# Patient Record
Sex: MALE | Race: BLACK OR AFRICAN AMERICAN | NOT HISPANIC OR LATINO | Employment: OTHER | URBAN - METROPOLITAN AREA
[De-identification: names, ages, dates, MRNs, and addresses within clinical notes are randomized per-mention and may not be internally consistent; named-entity substitution may affect disease eponyms.]

---

## 2019-05-13 PROBLEM — E11.8 TYPE 2 DIABETES MELLITUS WITH COMPLICATION, WITHOUT LONG-TERM CURRENT USE OF INSULIN: Status: ACTIVE | Noted: 2019-05-13

## 2019-05-13 PROBLEM — H90.3 SENSORINEURAL HEARING LOSS (SNHL) OF BOTH EARS: Status: ACTIVE | Noted: 2018-03-26

## 2019-05-13 PROBLEM — L72.0 EPIDERMAL INCLUSION CYST: Status: ACTIVE | Noted: 2019-05-13

## 2019-05-13 PROBLEM — L72.0 EPIDERMOID CYST OF SKIN: Status: ACTIVE | Noted: 2019-03-25

## 2019-06-21 PROBLEM — H40.1133 PRIMARY OPEN ANGLE GLAUCOMA (POAG) OF BOTH EYES, SEVERE STAGE: Status: ACTIVE | Noted: 2019-06-21

## 2020-01-10 PROBLEM — H40.1132 PRIMARY OPEN ANGLE GLAUCOMA (POAG) OF BOTH EYES, MODERATE STAGE: Status: ACTIVE | Noted: 2019-06-21

## 2020-02-26 PROBLEM — H01.00B BLEPHARITIS OF UPPER AND LOWER EYELIDS OF BOTH EYES: Status: ACTIVE | Noted: 2020-02-26

## 2020-02-26 PROBLEM — H01.00A BLEPHARITIS OF UPPER AND LOWER EYELIDS OF BOTH EYES: Status: ACTIVE | Noted: 2020-02-26

## 2021-01-29 PROBLEM — H02.052 TRICHIASIS OF RIGHT LOWER EYELID: Status: ACTIVE | Noted: 2021-01-29

## 2021-01-29 PROBLEM — E11.9 CONTROLLED TYPE 2 DIABETES MELLITUS WITHOUT COMPLICATION, WITHOUT LONG-TERM CURRENT USE OF INSULIN: Status: ACTIVE | Noted: 2019-05-13

## 2021-08-04 PROBLEM — H40.1133 PRIMARY OPEN ANGLE GLAUCOMA (POAG) OF BOTH EYES, SEVERE STAGE: Status: ACTIVE | Noted: 2021-08-04

## 2022-04-28 PROBLEM — Z09 S/P EYE SURGERY, FOLLOW-UP EXAM: Status: ACTIVE | Noted: 2022-04-28

## 2022-08-01 PROBLEM — Z09 S/P EYE SURGERY, FOLLOW-UP EXAM: Status: RESOLVED | Noted: 2022-04-28 | Resolved: 2022-08-01

## 2023-02-09 PROBLEM — K02.9 CARIES: Status: ACTIVE | Noted: 2023-02-09

## 2023-11-18 PROBLEM — K13.79 LESION OF SOFT PALATE: Status: ACTIVE | Noted: 2023-11-18

## 2023-11-18 PROBLEM — R22.0 MASS OF SOFT PALATE: Status: ACTIVE | Noted: 2023-11-18

## 2023-11-18 PROBLEM — K13.79 LESION OF SOFT PALATE: Status: RESOLVED | Noted: 2023-11-18 | Resolved: 2023-11-18

## 2023-12-08 PROBLEM — R22.1 MASS OF RIGHT SIDE OF NECK: Status: ACTIVE | Noted: 2023-12-08

## 2024-01-25 PROBLEM — Z51.11 ENCOUNTER FOR ANTINEOPLASTIC CHEMOTHERAPY: Status: ACTIVE | Noted: 2024-01-25

## 2024-01-25 PROBLEM — Z01.818 EXAMINATION PRIOR TO CHEMOTHERAPY: Status: ACTIVE | Noted: 2024-01-25

## 2024-02-01 PROBLEM — C05.1 SQUAMOUS CELL CARCINOMA OF SOFT PALATE: Status: ACTIVE | Noted: 2024-02-01

## 2024-02-02 PROBLEM — R11.2 CHEMOTHERAPY INDUCED NAUSEA AND VOMITING: Status: ACTIVE | Noted: 2024-02-02

## 2024-02-02 PROBLEM — T45.1X5A CHEMOTHERAPY INDUCED NAUSEA AND VOMITING: Status: ACTIVE | Noted: 2024-02-02

## 2024-03-19 PROBLEM — G40.909 SEIZURE DISORDER: Status: ACTIVE | Noted: 2024-03-19

## 2024-03-19 PROBLEM — N17.9 AKI (ACUTE KIDNEY INJURY): Status: ACTIVE | Noted: 2024-03-19

## 2024-03-19 PROBLEM — E87.5 HYPERKALEMIA: Status: ACTIVE | Noted: 2024-03-19

## 2024-06-24 PROBLEM — N17.9 AKI (ACUTE KIDNEY INJURY): Status: RESOLVED | Noted: 2024-03-19 | Resolved: 2024-06-24

## 2024-09-20 ENCOUNTER — SOCIAL WORK (OUTPATIENT)
Dept: ADMINISTRATIVE | Facility: OTHER | Age: 59
End: 2024-09-20

## 2024-09-20 NOTE — PROGRESS NOTES
Consult received to assist pt with obtaining a [employer] Flexitouch pump. Called [phone] to discuss consult received for assistance and the process and pt reported that he did not know anything about a pump as this was not discussed with him. Informed pt that Sw will follow up with MD and call him on Monday after speaking with MD. Will continue to follow.       Grady Cleaning LMSW    Ext [phone]/Pager [phone]

## 2024-09-27 ENCOUNTER — SOCIAL WORK (OUTPATIENT)
Dept: ADMINISTRATIVE | Facility: OTHER | Age: 59
End: 2024-09-27

## 2024-09-27 NOTE — PROGRESS NOTES
Informed by NP vi secure chat that she called and spoke with pt regarding the flexitouch pump. Called [phone] and discussed with him regarding referral for assistance with the flexitouch pump through [employer] [facility] and informed him that someone would be calling him to discuss it in more details. Pt verbalized understanding. Faxed referral to [phone] for assistance. Will continue to follow pt and assist as needed.       Grady Cleaning LMSW    Ext [phone]/Pager [phone]

## 2024-10-03 ENCOUNTER — SOCIAL WORK (OUTPATIENT)
Dept: ADMINISTRATIVE | Facility: OTHER | Age: 59
End: 2024-10-03

## 2024-10-03 NOTE — PROGRESS NOTES
Sent a email to Mercedez Posadas with [facility] to follow up with her in regards to receiving referral for flexitouch device. Awaiting a response.       Grady Cleaning LMSW    Ext [phone]/Pager [phone]

## 2024-10-09 ENCOUNTER — SOCIAL WORK (OUTPATIENT)
Dept: ADMINISTRATIVE | Facility: OTHER | Age: 59
End: 2024-10-09

## 2024-10-09 NOTE — PROGRESS NOTES
Received email from Mercedez Posadas with [employer] confirming receipt of referral for pt and states pt should be getting a call this week for the flexitouch eval. Will continue to follow pt and assist.       Grady Cleaning LMSW    Ext [phone]/Pager [phone]

## 2024-10-10 PROBLEM — I89.0 LYMPHEDEMA DUE TO RADIATION: Status: ACTIVE | Noted: 2024-10-10

## 2024-11-06 ENCOUNTER — OUTPATIENT CASE MANAGEMENT (OUTPATIENT)
Dept: ADMINISTRATIVE | Facility: OTHER | Age: 59
End: 2024-11-06

## 2024-11-06 NOTE — PROGRESS NOTES
Sent a follow up email to Mercedez Posadas(Senior Account ) with [facility] on 10/25/24 to get a status update on the Lymphedema Flexitouch device. Received a response email from Mercedez cortés informing that they are still waiting for insurance decision. Will continue to follow.       Grady Cleaning LMSW    Ext [phone]/Pager [phone]

## 2024-12-19 ENCOUNTER — SOCIAL WORK (OUTPATIENT)
Dept: ADMINISTRATIVE | Facility: OTHER | Age: 59
End: 2024-12-19

## 2024-12-19 NOTE — PROGRESS NOTES
Sent follow up email to Mercedez with [facility] to get a status update on the Flexitouch Lymphedema device. Awaiting a response email. Will continue to follow.       Grady Cleaning LMSW  Outpatient Case Management Social Worker  Ext [phone]

## 2025-02-03 ENCOUNTER — OUTPATIENT CASE MANAGEMENT (OUTPATIENT)
Dept: ADMINISTRATIVE | Facility: OTHER | Age: 60
End: 2025-02-03

## 2025-02-03 NOTE — PROGRESS NOTES
Sent a follow up email to Mercedez with [facility] on 1/14/25 to get a status update on the lymphedema device and received a response on 1/21/25 back from Mercedez reporting that pt insurance denied and they have reached out to pt numerous times to appeal for pt but no response. Will continue to follow pt and assist as needed      Grady Cleaning LMSW  Outpatient Case Management Social Worker  Ext [phone]

## 2025-03-19 PROBLEM — E11.9 DIABETES MELLITUS WITHOUT COMPLICATION: Status: ACTIVE | Noted: 2025-03-19

## 2025-03-19 PROBLEM — L84 CORNS AND CALLOSITIES: Status: ACTIVE | Noted: 2025-03-19

## 2025-03-19 PROBLEM — E11.40 DIABETIC NEUROPATHY WITH NEUROLOGIC COMPLICATION: Status: ACTIVE | Noted: 2025-03-19

## 2025-03-19 PROBLEM — L60.3 DYSTROPHIC NAIL: Status: ACTIVE | Noted: 2025-03-19

## 2025-03-19 PROBLEM — E11.49 DIABETIC NEUROPATHY WITH NEUROLOGIC COMPLICATION: Status: ACTIVE | Noted: 2025-03-19

## 2025-03-28 PROBLEM — R13.13 DYSPHAGIA, PHARYNGEAL PHASE: Status: ACTIVE | Noted: 2025-03-28

## 2025-05-21 PROBLEM — R13.19 ESOPHAGEAL DYSPHAGIA: Status: ACTIVE | Noted: 2025-05-21

## 2025-05-21 PROBLEM — Z12.11 ENCOUNTER FOR SCREENING COLONOSCOPY: Status: ACTIVE | Noted: 2025-05-21

## 2025-05-21 PROBLEM — K63.5 POLYP OF COLON: Status: ACTIVE | Noted: 2025-05-21

## 2025-06-08 PROBLEM — E11.3293 MILD NONPROLIFERATIVE DIABETIC RETINOPATHY OF BOTH EYES ASSOCIATED WITH TYPE 2 DIABETES MELLITUS: Status: ACTIVE | Noted: 2025-06-08

## 2025-06-08 PROBLEM — Z96.1 PSEUDOPHAKIA OF BOTH EYES: Status: ACTIVE | Noted: 2025-06-08

## 2025-07-30 ENCOUNTER — OUTPATIENT CASE MANAGEMENT (OUTPATIENT)
Dept: ADMINISTRATIVE | Facility: OTHER | Age: 60
End: 2025-07-30

## 2025-07-30 NOTE — PROGRESS NOTES
Outpatient Care Management   - Patient Assessment    Patient: Brando Chamberlain  MRN:  [id number]  Date of Service:  7/30/2025  Completed by:  Grady Cleaning LMSW  Referral Date:     Reason for Visit   Patient presents with    OPCM Enrollment Call    Social Work Assessment       Brief Summary:  received a referral from outpatient provider for the following Low/Mod SW psychosocial needs DME-(rollator).  The patient also requests assistance with Advance Care Planning. Care plan was created in collaboration with patient/caregiver input.   completed the SDOH questionnaire. LG sent inbox message to [facility] Triage to address the following care gaps: A1C Care Gap. Received in person visit from pt in regards to assistance with DME. Discussed with pt about the OPCM program and pt agreed to enrollment. Pt is a 60 year old  male who was diagnosed with throat cancer. Pt reports that his cancer is currently in remission.  Pt reports when he did have cancer he received 2 rounds of chemo treatments and 37 radiation treatments about a year ago. Pt reports also being diagnosed with Diabetes and High Blood Pressure. Pt reports only using a walking cane at this time. Pt reports that he lives alone and requesting the rollator for walking long distance. Pt reports being able to take of his ADL's. Pt reports that his source of support is from his children, grandchildren and siblings. Pt reports that he is disabled and receives disability. Pt reports also receiving food stamps. Pt denies any current or past mental health. Provided pt with Advance Care Planning for review and completion. Informed pt that Sw will reach out to MD about rollator prescription and once obtained will make referral to a DME provider. Pt did not have preference for a DME provider. Informed pt that Sw will make referral for rollator once order is received. Pt verbalized understanding. Will  continue to follow pt and provide ongoing psychosocial support.       Grady Cleaning LMSW  Outpatient Case Management Social Worker  Ext [phone]

## 2025-08-04 ENCOUNTER — OUTPATIENT CASE MANAGEMENT (OUTPATIENT)
Dept: ADMINISTRATIVE | Facility: OTHER | Age: 60
End: 2025-08-04

## 2025-08-06 ENCOUNTER — OUTPATIENT CASE MANAGEMENT (OUTPATIENT)
Dept: ADMINISTRATIVE | Facility: OTHER | Age: 60
End: 2025-08-06

## 2025-08-20 ENCOUNTER — OUTPATIENT CASE MANAGEMENT (OUTPATIENT)
Dept: ADMINISTRATIVE | Facility: OTHER | Age: 60
End: 2025-08-20